# Patient Record
Sex: FEMALE | Race: WHITE | NOT HISPANIC OR LATINO | Employment: FULL TIME | URBAN - METROPOLITAN AREA
[De-identification: names, ages, dates, MRNs, and addresses within clinical notes are randomized per-mention and may not be internally consistent; named-entity substitution may affect disease eponyms.]

---

## 2024-12-08 ENCOUNTER — OFFICE VISIT (OUTPATIENT)
Dept: URGENT CARE | Age: 54
End: 2024-12-08
Payer: COMMERCIAL

## 2024-12-08 VITALS — OXYGEN SATURATION: 97 % | HEART RATE: 77 BPM | SYSTOLIC BLOOD PRESSURE: 152 MMHG | DIASTOLIC BLOOD PRESSURE: 70 MMHG

## 2024-12-08 DIAGNOSIS — U07.1 ACUTE COVID-19: ICD-10-CM

## 2024-12-08 LAB — POC SARS-COV-2 AG BINAX: ABNORMAL

## 2024-12-08 PROCEDURE — 99203 OFFICE O/P NEW LOW 30 MIN: CPT | Performed by: FAMILY MEDICINE

## 2024-12-08 PROCEDURE — 87811 SARS-COV-2 COVID19 W/OPTIC: CPT | Performed by: FAMILY MEDICINE

## 2024-12-08 RX ORDER — DICYCLOMINE HYDROCHLORIDE 10 MG/1
10 CAPSULE ORAL
COMMUNITY

## 2024-12-08 RX ORDER — QUETIAPINE FUMARATE 200 MG/1
TABLET, FILM COATED ORAL
COMMUNITY
Start: 2016-07-11

## 2024-12-08 RX ORDER — TOPIRAMATE 25 MG/1
25 TABLET ORAL NIGHTLY
COMMUNITY
Start: 2024-03-10

## 2024-12-08 RX ORDER — DIPHENOXYLATE HYDROCHLORIDE AND ATROPINE SULFATE 2.5; .025 MG/1; MG/1
TABLET ORAL
COMMUNITY
Start: 2023-08-14

## 2024-12-08 RX ORDER — LAMOTRIGINE 200 MG/1
TABLET, ORALLY DISINTEGRATING ORAL
COMMUNITY

## 2024-12-08 RX ORDER — TRAZODONE HYDROCHLORIDE 50 MG/1
50 TABLET ORAL NIGHTLY
COMMUNITY

## 2024-12-08 RX ORDER — LUMATEPERONE 42 MG/1
42 CAPSULE ORAL
COMMUNITY
Start: 2024-06-21

## 2024-12-08 RX ORDER — SUMATRIPTAN 50 MG/1
TABLET, FILM COATED ORAL
COMMUNITY

## 2024-12-08 RX ORDER — PANTOPRAZOLE SODIUM 40 MG/1
40 TABLET, DELAYED RELEASE ORAL
COMMUNITY
Start: 2024-09-06

## 2024-12-08 RX ORDER — FLUOXETINE HYDROCHLORIDE 40 MG/1
CAPSULE ORAL
COMMUNITY

## 2024-12-08 RX ORDER — FAMOTIDINE 20 MG/1
1 TABLET, FILM COATED ORAL
COMMUNITY
Start: 2024-05-08

## 2024-12-08 RX ORDER — ESTROGENS, CONJUGATED 0.62 MG/1
0.62 TABLET, FILM COATED ORAL
COMMUNITY
Start: 2024-07-15

## 2024-12-08 RX ORDER — VILAZODONE HYDROCHLORIDE 40 MG/1
TABLET ORAL
COMMUNITY
Start: 2023-09-12

## 2024-12-08 RX ORDER — IPRATROPIUM BROMIDE 21 UG/1
SPRAY, METERED NASAL
COMMUNITY
Start: 2023-01-05

## 2024-12-08 RX ORDER — DIAZEPAM 5 MG/1
TABLET ORAL
COMMUNITY
Start: 2023-09-26

## 2024-12-08 RX ORDER — VALACYCLOVIR HYDROCHLORIDE 500 MG/1
TABLET, FILM COATED ORAL
COMMUNITY
Start: 2024-11-04

## 2024-12-08 RX ORDER — PRAZOSIN HYDROCHLORIDE 1 MG/1
CAPSULE ORAL
COMMUNITY

## 2024-12-08 RX ORDER — TIZANIDINE 4 MG/1
TABLET ORAL
COMMUNITY
Start: 2018-03-06

## 2024-12-08 RX ORDER — QUETIAPINE FUMARATE 100 MG/1
100 TABLET, FILM COATED ORAL NIGHTLY
COMMUNITY
Start: 2024-05-31

## 2024-12-08 RX ORDER — LEVOFLOXACIN 500 MG/1
500 TABLET, FILM COATED ORAL
COMMUNITY
Start: 2024-11-18

## 2024-12-08 ASSESSMENT — PAIN SCALES - GENERAL: PAINLEVEL_OUTOF10: 0-NO PAIN

## 2024-12-08 NOTE — PROGRESS NOTES
Subjective   Patient ID: Chantelle Han is a 54 y.o. female. They present today with a chief complaint of Sore Throat, Cough, and Headache (X1 day tested positive for covid today).    Patient disposition: Home    History of Present Illness  HPI  1 day of flulike symptoms.  Tested positive for COVID at home.  Has been recently had COVID.  Symptoms including sore throat, headache and sinus congestion.  Fatigue.  Does not feel terribly sick.  No history of asthma or bronchitis.  History of UC.  No abdominal pain.  No history of COVID in the past.      Past Medical History  Allergies as of 12/08/2024 - Reviewed 12/08/2024   Allergen Reaction Noted    Amoxicillin Rash and Itching 10/17/2019    Telithromycin Diarrhea 01/24/2006    Doxycycline Unknown 09/08/2011    Erythromycin Unknown 12/08/2024    Morphine GI Upset 08/05/2024    Pregabalin Other 09/06/2018    Salicylates GI Upset 09/08/2011    Adhesive Rash 05/08/2024    Derma plus Rash 05/03/2018       (Not in a hospital admission)       Current Outpatient Medications   Medication Sig Dispense Refill    Caplyta 42 mg capsule Take 42 mg by mouth once daily.      diazePAM (Valium) 5 mg tablet Take by mouth.      diphenoxylate-atropine (Lomotil) 2.5-0.025 mg tablet TAKE 1 TABLET BY MOUTH THREE TIMES A DAY AS NEEDED FOR DIARRHEA      famotidine (Pepcid) 20 mg tablet Take 1 tablet by mouth early in the morning..      ipratropium (Atrovent) 21 mcg (0.03 %) nasal spray USE 2 SPRAYS IN EACH NOSTRIL TWICE DAILY AS NEEDED      levoFLOXacin (Levaquin) 500 mg tablet Take 500 mg by mouth once daily.      pantoprazole (ProtoNix) 40 mg EC tablet Take 40 mg by mouth once daily.      Premarin 0.625 mg tablet Take 0.625 mg by mouth once daily.      QUEtiapine (SEROquel) 100 mg tablet Take 100 mg by mouth once daily at bedtime.      QUEtiapine (SEROquel) 200 mg tablet       tiZANidine (Zanaflex) 4 mg tablet Take by mouth.      topiramate (Topamax) 25 mg tablet Take 25 mg by mouth once  daily at bedtime.      valACYclovir (Valtrex) 500 mg tablet       Viibryd 40 mg tablet Take by mouth.      dicyclomine (Bentyl) 10 mg capsule Take 10 mg by mouth.      FLUoxetine (PROzac) 40 mg capsule       lamoTRIgine (LaMICtal) 200 mg disintegrating tablet       molnupiravir (Lagevrio) capsule capsule Take 800 mg by mouth twice a day.      prazosin (Minipress) 1 mg capsule       SUMAtriptan (Imitrex) 50 mg tablet       traZODone (Desyrel) 50 mg tablet Take 50 mg by mouth once daily at bedtime.       No current facility-administered medications for this visit.       There is no problem list on file for this patient.      Past Surgical History:   Procedure Laterality Date    ANKLE SURGERY  07/18/2013    Ankle Surgery    APPENDECTOMY  07/18/2013    Appendectomy    CERVICAL FUSION  07/18/2013    Cervical Vertebral Fusion    KNEE SURGERY  07/18/2013    Knee Surgery Left    OTHER SURGICAL HISTORY  03/01/2018    Lower Back Surgery Lumbar Disc    TONSILLECTOMY  07/18/2013    Tonsillectomy        reports that she has never smoked. She has never used smokeless tobacco.    Review of Systems  As noted in HPI. ROS otherwise negative unless noted.       Objective    Vitals:    12/08/24 1715   BP: 152/70   BP Location: Left arm   Patient Position: Sitting   BP Cuff Size: Adult   Pulse: 77   TempSrc: Oral   SpO2: 97%     No LMP recorded.    Physical Exam  Constitutional: vital signs reviewed. Well developed, well nourished. patient alert and patient without distress.   Psych: Normal mood and affect  Head and Face: Normal and atraumatic.    Cardiovascular: Heart rate normal, normal S1 and S2, no gallops, no murmurs and no pericardial rub. Rhythm: Normal.  Pulmonary: No respiratory distress. Palpation of chest: Normal. Clear bilateral breath sounds.   Skin: Normal skin color and pigmentation, normal skin turgor, and no rash.  ENT: No cervical lymphadenopathy.  Posterior pharynx normal.  Oral mucosal moist normal.  Normal bilateral  ear canals, TMs and external ears.          Procedures    Point of Care Test & Imaging Results from this visit  Results for orders placed or performed in visit on 12/08/24   POCT BinaxNOW Covid-19 Ag Card manually resulted    Collection Time: 12/08/24  5:23 PM   Result Value Ref Range    POC DINO-COV-2 AG Positive test for SARS-CoV-2 (antigen detected) (A) Presumptive negative test for SARS-CoV-2 (no antigen detected)            Diagnostic study results (if any) were reviewed.    Assessment/Plan   Allergies, medications, history, and pertinent labs/EKGs/Imaging reviewed.    Medical Decision Making  See note    Orders and Diagnoses  Diagnoses and all orders for this visit:  Sore throat  -     POCT BinaxNOW Covid-19 Ag Card manually resulted      Medical Admin Record      Follow Up Instructions  No follow-ups on file.    At time of discharge patient was clinically well-appearing and HDS for outpatient management. The patient and/or family was educated regarding diagnosis, supportive care, OTC and Rx medications. The patient and/or family was given the opportunity to ask questions prior to discharge and all questions answered. They verbalized understanding of my discussion of the plans for treatment, expected course, indications to return to  or seek further evaluation in ED, and the need for timely follow up as directed.      Electronically signed by [facility]

## 2024-12-08 NOTE — PATIENT INSTRUCTIONS
You tested positive for COVID-19.  Symptoms may last for up to 1-2 weeks, but follow up with PCP if your symptoms start worsening.  For muscle aches, fever, chills: Acetaminophen or Ibuprofen, Advil, or Aleve can be used.  Hydration: Maintain adequate hydration with water.  Nasal symptoms: Nasal Saline available over-the-counter, can be used 3-4 times per day  Decongestants reduce nasal congestion and discharge  Vaseline at opening of nares may reduce irritation   Cool Mist Humidifier may loosens discharge  Cough Suppressants or expectorants may be taken over-the-counter    Antiviral medication and treatment for COVID-19 has been discussed as well as medications, side effects, course.  This medicine will be deferred at this time due to the severity of your symptoms      Hand hygiene is important, please wash your hands before and after coming in contact with other people.    CDC recommends isolating at home for the first 5 days of symptoms.  On a 6-day, if you are feeling better, you may return to the community, however, wear a mask at all times for the following 5 days.  Covid19 can make you more sick and lead to secondary infections, if you start having new or worsening symptoms such as persistent fevers, trouble breathing, follow-up sooner or go to the emergency room.

## 2025-07-15 ENCOUNTER — APPOINTMENT (OUTPATIENT)
Dept: CARDIOLOGY | Facility: HOSPITAL | Age: 55
End: 2025-07-15
Payer: COMMERCIAL

## 2025-07-15 ENCOUNTER — APPOINTMENT (OUTPATIENT)
Dept: RADIOLOGY | Facility: HOSPITAL | Age: 55
End: 2025-07-15
Payer: COMMERCIAL

## 2025-07-15 ENCOUNTER — HOSPITAL ENCOUNTER (EMERGENCY)
Facility: HOSPITAL | Age: 55
Discharge: HOME | End: 2025-07-15
Attending: STUDENT IN AN ORGANIZED HEALTH CARE EDUCATION/TRAINING PROGRAM
Payer: COMMERCIAL

## 2025-07-15 VITALS
DIASTOLIC BLOOD PRESSURE: 67 MMHG | WEIGHT: 130 LBS | OXYGEN SATURATION: 96 % | HEIGHT: 64 IN | HEART RATE: 96 BPM | TEMPERATURE: 97.9 F | BODY MASS INDEX: 22.2 KG/M2 | RESPIRATION RATE: 18 BRPM | SYSTOLIC BLOOD PRESSURE: 116 MMHG

## 2025-07-15 DIAGNOSIS — E83.42 HYPOMAGNESEMIA: ICD-10-CM

## 2025-07-15 DIAGNOSIS — R09.1 PLEURITIS: ICD-10-CM

## 2025-07-15 DIAGNOSIS — R07.9 ACUTE CHEST PAIN: Primary | ICD-10-CM

## 2025-07-15 LAB
ALBUMIN SERPL BCP-MCNC: 4.5 G/DL (ref 3.4–5)
ALP SERPL-CCNC: 66 U/L (ref 33–110)
ALT SERPL W P-5'-P-CCNC: 10 U/L (ref 7–45)
ANION GAP SERPL CALC-SCNC: 12 MMOL/L (ref 10–20)
APPEARANCE UR: CLEAR
AST SERPL W P-5'-P-CCNC: 17 U/L (ref 9–39)
ATRIAL RATE: 101 BPM
ATRIAL RATE: 133 BPM
BACTERIA #/AREA URNS AUTO: ABNORMAL /HPF
BASOPHILS # BLD AUTO: 0.1 X10*3/UL (ref 0–0.1)
BASOPHILS NFR BLD AUTO: 0.6 %
BILIRUB SERPL-MCNC: 0.5 MG/DL (ref 0–1.2)
BILIRUB UR STRIP.AUTO-MCNC: NEGATIVE MG/DL
BUN SERPL-MCNC: 7 MG/DL (ref 6–23)
CALCIUM SERPL-MCNC: 9.2 MG/DL (ref 8.6–10.3)
CARDIAC TROPONIN I PNL SERPL HS: <3 NG/L (ref 0–13)
CARDIAC TROPONIN I PNL SERPL HS: <3 NG/L (ref 0–13)
CHLORIDE SERPL-SCNC: 99 MMOL/L (ref 98–107)
CO2 SERPL-SCNC: 30 MMOL/L (ref 21–32)
COLOR UR: COLORLESS
CREAT SERPL-MCNC: 0.73 MG/DL (ref 0.5–1.05)
CRP SERPL-MCNC: 0.64 MG/DL
EGFRCR SERPLBLD CKD-EPI 2021: >90 ML/MIN/1.73M*2
EOSINOPHIL # BLD AUTO: 0.29 X10*3/UL (ref 0–0.7)
EOSINOPHIL NFR BLD AUTO: 1.7 %
ERYTHROCYTE [DISTWIDTH] IN BLOOD BY AUTOMATED COUNT: 12.3 % (ref 11.5–14.5)
GLUCOSE SERPL-MCNC: 103 MG/DL (ref 74–99)
GLUCOSE UR STRIP.AUTO-MCNC: NORMAL MG/DL
HCT VFR BLD AUTO: 39.5 % (ref 36–46)
HGB BLD-MCNC: 13.4 G/DL (ref 12–16)
HOLD SPECIMEN: NORMAL
IMM GRANULOCYTES # BLD AUTO: 0.04 X10*3/UL (ref 0–0.7)
IMM GRANULOCYTES NFR BLD AUTO: 0.2 % (ref 0–0.9)
INR PPP: 1 (ref 0.9–1.1)
KETONES UR STRIP.AUTO-MCNC: NEGATIVE MG/DL
LACTATE SERPL-SCNC: 1 MMOL/L (ref 0.4–2)
LEUKOCYTE ESTERASE UR QL STRIP.AUTO: NEGATIVE
LIPASE SERPL-CCNC: 40 U/L (ref 9–82)
LYMPHOCYTES # BLD AUTO: 4.59 X10*3/UL (ref 1.2–4.8)
LYMPHOCYTES NFR BLD AUTO: 27 %
MCH RBC QN AUTO: 33 PG (ref 26–34)
MCHC RBC AUTO-ENTMCNC: 33.9 G/DL (ref 32–36)
MCV RBC AUTO: 97 FL (ref 80–100)
MONOCYTES # BLD AUTO: 1.14 X10*3/UL (ref 0.1–1)
MONOCYTES NFR BLD AUTO: 6.7 %
NEUTROPHILS # BLD AUTO: 10.86 X10*3/UL (ref 1.2–7.7)
NEUTROPHILS NFR BLD AUTO: 63.8 %
NITRITE UR QL STRIP.AUTO: NEGATIVE
NRBC BLD-RTO: 0 /100 WBCS (ref 0–0)
P AXIS: 56 DEGREES
P AXIS: 61 DEGREES
P OFFSET: 195 MS
P OFFSET: 211 MS
P ONSET: 139 MS
P ONSET: 150 MS
PH UR STRIP.AUTO: 7 [PH]
PLATELET # BLD AUTO: 313 X10*3/UL (ref 150–450)
POTASSIUM SERPL-SCNC: 3 MMOL/L (ref 3.5–5.3)
PR INTERVAL: 148 MS
PR INTERVAL: 168 MS
PROT SERPL-MCNC: 7.5 G/DL (ref 6.4–8.2)
PROT UR STRIP.AUTO-MCNC: NEGATIVE MG/DL
PROTHROMBIN TIME: 11.3 SECONDS (ref 9.8–12.4)
Q ONSET: 223 MS
Q ONSET: 224 MS
QRS COUNT: 17 BEATS
QRS COUNT: 22 BEATS
QRS DURATION: 72 MS
QRS DURATION: 74 MS
QT INTERVAL: 290 MS
QT INTERVAL: 358 MS
QTC CALCULATION(BAZETT): 431 MS
QTC CALCULATION(BAZETT): 464 MS
QTC FREDERICIA: 377 MS
QTC FREDERICIA: 426 MS
R AXIS: 13 DEGREES
R AXIS: 2 DEGREES
RBC # BLD AUTO: 4.06 X10*6/UL (ref 4–5.2)
RBC # UR STRIP.AUTO: ABNORMAL MG/DL
RBC #/AREA URNS AUTO: ABNORMAL /HPF
SODIUM SERPL-SCNC: 138 MMOL/L (ref 136–145)
SP GR UR STRIP.AUTO: 1.03
SQUAMOUS #/AREA URNS AUTO: ABNORMAL /HPF
T AXIS: 162 DEGREES
T AXIS: 85 DEGREES
T OFFSET: 369 MS
T OFFSET: 402 MS
UROBILINOGEN UR STRIP.AUTO-MCNC: NORMAL MG/DL
VENTRICULAR RATE: 101 BPM
VENTRICULAR RATE: 133 BPM
WBC # BLD AUTO: 17 X10*3/UL (ref 4.4–11.3)
WBC #/AREA URNS AUTO: ABNORMAL /HPF

## 2025-07-15 PROCEDURE — 2500000004 HC RX 250 GENERAL PHARMACY W/ HCPCS (ALT 636 FOR OP/ED)

## 2025-07-15 PROCEDURE — 86140 C-REACTIVE PROTEIN: CPT | Performed by: STUDENT IN AN ORGANIZED HEALTH CARE EDUCATION/TRAINING PROGRAM

## 2025-07-15 PROCEDURE — 85025 COMPLETE CBC W/AUTO DIFF WBC: CPT | Performed by: STUDENT IN AN ORGANIZED HEALTH CARE EDUCATION/TRAINING PROGRAM

## 2025-07-15 PROCEDURE — 2500000005 HC RX 250 GENERAL PHARMACY W/O HCPCS: Performed by: STUDENT IN AN ORGANIZED HEALTH CARE EDUCATION/TRAINING PROGRAM

## 2025-07-15 PROCEDURE — 99285 EMERGENCY DEPT VISIT HI MDM: CPT | Mod: 25 | Performed by: STUDENT IN AN ORGANIZED HEALTH CARE EDUCATION/TRAINING PROGRAM

## 2025-07-15 PROCEDURE — 96365 THER/PROPH/DIAG IV INF INIT: CPT | Mod: 59

## 2025-07-15 PROCEDURE — 74177 CT ABD & PELVIS W/CONTRAST: CPT

## 2025-07-15 PROCEDURE — 84484 ASSAY OF TROPONIN QUANT: CPT | Performed by: STUDENT IN AN ORGANIZED HEALTH CARE EDUCATION/TRAINING PROGRAM

## 2025-07-15 PROCEDURE — 96376 TX/PRO/DX INJ SAME DRUG ADON: CPT | Mod: 59

## 2025-07-15 PROCEDURE — 2550000001 HC RX 255 CONTRASTS: Performed by: STUDENT IN AN ORGANIZED HEALTH CARE EDUCATION/TRAINING PROGRAM

## 2025-07-15 PROCEDURE — 71045 X-RAY EXAM CHEST 1 VIEW: CPT | Performed by: SURGERY

## 2025-07-15 PROCEDURE — 2500000001 HC RX 250 WO HCPCS SELF ADMINISTERED DRUGS (ALT 637 FOR MEDICARE OP): Performed by: STUDENT IN AN ORGANIZED HEALTH CARE EDUCATION/TRAINING PROGRAM

## 2025-07-15 PROCEDURE — 85610 PROTHROMBIN TIME: CPT | Performed by: STUDENT IN AN ORGANIZED HEALTH CARE EDUCATION/TRAINING PROGRAM

## 2025-07-15 PROCEDURE — 93005 ELECTROCARDIOGRAM TRACING: CPT

## 2025-07-15 PROCEDURE — 96366 THER/PROPH/DIAG IV INF ADDON: CPT

## 2025-07-15 PROCEDURE — 71275 CT ANGIOGRAPHY CHEST: CPT | Performed by: SURGERY

## 2025-07-15 PROCEDURE — 80053 COMPREHEN METABOLIC PANEL: CPT | Performed by: STUDENT IN AN ORGANIZED HEALTH CARE EDUCATION/TRAINING PROGRAM

## 2025-07-15 PROCEDURE — 71045 X-RAY EXAM CHEST 1 VIEW: CPT

## 2025-07-15 PROCEDURE — 83690 ASSAY OF LIPASE: CPT | Performed by: STUDENT IN AN ORGANIZED HEALTH CARE EDUCATION/TRAINING PROGRAM

## 2025-07-15 PROCEDURE — 2500000001 HC RX 250 WO HCPCS SELF ADMINISTERED DRUGS (ALT 637 FOR MEDICARE OP)

## 2025-07-15 PROCEDURE — 36415 COLL VENOUS BLD VENIPUNCTURE: CPT | Performed by: STUDENT IN AN ORGANIZED HEALTH CARE EDUCATION/TRAINING PROGRAM

## 2025-07-15 PROCEDURE — 71275 CT ANGIOGRAPHY CHEST: CPT

## 2025-07-15 PROCEDURE — 74177 CT ABD & PELVIS W/CONTRAST: CPT | Performed by: SURGERY

## 2025-07-15 PROCEDURE — 81001 URINALYSIS AUTO W/SCOPE: CPT | Performed by: STUDENT IN AN ORGANIZED HEALTH CARE EDUCATION/TRAINING PROGRAM

## 2025-07-15 PROCEDURE — 83605 ASSAY OF LACTIC ACID: CPT | Performed by: STUDENT IN AN ORGANIZED HEALTH CARE EDUCATION/TRAINING PROGRAM

## 2025-07-15 PROCEDURE — 2500000004 HC RX 250 GENERAL PHARMACY W/ HCPCS (ALT 636 FOR OP/ED): Mod: JZ | Performed by: STUDENT IN AN ORGANIZED HEALTH CARE EDUCATION/TRAINING PROGRAM

## 2025-07-15 PROCEDURE — 96375 TX/PRO/DX INJ NEW DRUG ADDON: CPT | Mod: 59

## 2025-07-15 RX ORDER — PREDNISONE 20 MG/1
40 TABLET ORAL DAILY
Qty: 10 TABLET | Refills: 0 | Status: SHIPPED | OUTPATIENT
Start: 2025-07-15 | End: 2025-07-20

## 2025-07-15 RX ORDER — LIDOCAINE 560 MG/1
1 PATCH PERCUTANEOUS; TOPICAL; TRANSDERMAL ONCE
Status: DISCONTINUED | OUTPATIENT
Start: 2025-07-15 | End: 2025-07-15 | Stop reason: HOSPADM

## 2025-07-15 RX ORDER — ONDANSETRON HYDROCHLORIDE 2 MG/ML
4 INJECTION, SOLUTION INTRAVENOUS ONCE
Status: COMPLETED | OUTPATIENT
Start: 2025-07-15 | End: 2025-07-15

## 2025-07-15 RX ORDER — PREDNISONE 50 MG/1
50 TABLET ORAL ONCE
Status: COMPLETED | OUTPATIENT
Start: 2025-07-15 | End: 2025-07-15

## 2025-07-15 RX ORDER — CALCIUM CARBONATE 300MG(750)
400 TABLET,CHEWABLE ORAL DAILY
Qty: 10 TABLET | Refills: 0 | Status: SHIPPED | OUTPATIENT
Start: 2025-07-15 | End: 2025-07-25

## 2025-07-15 RX ORDER — POTASSIUM CHLORIDE 14.9 MG/ML
20 INJECTION INTRAVENOUS ONCE
Status: COMPLETED | OUTPATIENT
Start: 2025-07-15 | End: 2025-07-15

## 2025-07-15 RX ORDER — LIDOCAINE HYDROCHLORIDE 20 MG/ML
15 SOLUTION OROPHARYNGEAL ONCE
Status: COMPLETED | OUTPATIENT
Start: 2025-07-15 | End: 2025-07-15

## 2025-07-15 RX ORDER — HYDROMORPHONE HYDROCHLORIDE 1 MG/ML
1 INJECTION, SOLUTION INTRAMUSCULAR; INTRAVENOUS; SUBCUTANEOUS ONCE
Status: COMPLETED | OUTPATIENT
Start: 2025-07-15 | End: 2025-07-15

## 2025-07-15 RX ORDER — PREDNISONE 20 MG/1
40 TABLET ORAL DAILY
Qty: 10 TABLET | Refills: 0 | Status: SHIPPED | OUTPATIENT
Start: 2025-07-15 | End: 2025-07-15

## 2025-07-15 RX ORDER — ALUMINUM HYDROXIDE, MAGNESIUM HYDROXIDE, AND SIMETHICONE 1200; 120; 1200 MG/30ML; MG/30ML; MG/30ML
30 SUSPENSION ORAL ONCE
Status: COMPLETED | OUTPATIENT
Start: 2025-07-15 | End: 2025-07-15

## 2025-07-15 RX ORDER — OXYCODONE HYDROCHLORIDE 5 MG/1
5 TABLET ORAL EVERY 6 HOURS PRN
Qty: 7 TABLET | Refills: 0 | Status: SHIPPED | OUTPATIENT
Start: 2025-07-15

## 2025-07-15 RX ORDER — ACETAMINOPHEN 325 MG/1
975 TABLET ORAL ONCE
Status: COMPLETED | OUTPATIENT
Start: 2025-07-15 | End: 2025-07-15

## 2025-07-15 RX ORDER — KETOROLAC TROMETHAMINE 15 MG/ML
15 INJECTION, SOLUTION INTRAMUSCULAR; INTRAVENOUS ONCE
Status: COMPLETED | OUTPATIENT
Start: 2025-07-15 | End: 2025-07-15

## 2025-07-15 RX ORDER — OXYCODONE HYDROCHLORIDE 5 MG/1
5 TABLET ORAL EVERY 6 HOURS PRN
Qty: 7 TABLET | Refills: 0 | Status: SHIPPED | OUTPATIENT
Start: 2025-07-15 | End: 2025-07-15

## 2025-07-15 RX ADMIN — IOHEXOL 75 ML: 350 INJECTION, SOLUTION INTRAVENOUS at 02:50

## 2025-07-15 RX ADMIN — SODIUM CHLORIDE, SODIUM LACTATE, POTASSIUM CHLORIDE, AND CALCIUM CHLORIDE 1000 ML: .6; .31; .03; .02 INJECTION, SOLUTION INTRAVENOUS at 02:31

## 2025-07-15 RX ADMIN — PREDNISONE 50 MG: 50 TABLET ORAL at 05:41

## 2025-07-15 RX ADMIN — ALUMINUM HYDROXIDE, MAGNESIUM HYDROXIDE, AND SIMETHICONE 30 ML: 200; 200; 20 SUSPENSION ORAL at 04:03

## 2025-07-15 RX ADMIN — ONDANSETRON 4 MG: 2 INJECTION INTRAMUSCULAR; INTRAVENOUS at 01:40

## 2025-07-15 RX ADMIN — HYDROMORPHONE HYDROCHLORIDE 1 MG: 1 INJECTION, SOLUTION INTRAMUSCULAR; INTRAVENOUS; SUBCUTANEOUS at 02:31

## 2025-07-15 RX ADMIN — POTASSIUM CHLORIDE 20 MEQ: 14.9 INJECTION, SOLUTION INTRAVENOUS at 02:31

## 2025-07-15 RX ADMIN — ACETAMINOPHEN 975 MG: 325 TABLET ORAL at 07:36

## 2025-07-15 RX ADMIN — HYDROMORPHONE HYDROCHLORIDE 0.5 MG: 1 INJECTION, SOLUTION INTRAMUSCULAR; INTRAVENOUS; SUBCUTANEOUS at 01:41

## 2025-07-15 RX ADMIN — LIDOCAINE HYDROCHLORIDE 15 ML: 20 SOLUTION ORAL at 04:03

## 2025-07-15 RX ADMIN — KETOROLAC TROMETHAMINE 15 MG: 15 INJECTION, SOLUTION INTRAMUSCULAR; INTRAVENOUS at 04:47

## 2025-07-15 ASSESSMENT — LIFESTYLE VARIABLES
EVER FELT BAD OR GUILTY ABOUT YOUR DRINKING: NO
HAVE YOU EVER FELT YOU SHOULD CUT DOWN ON YOUR DRINKING: NO
HAVE PEOPLE ANNOYED YOU BY CRITICIZING YOUR DRINKING: NO
TOTAL SCORE: 0
EVER HAD A DRINK FIRST THING IN THE MORNING TO STEADY YOUR NERVES TO GET RID OF A HANGOVER: NO

## 2025-07-15 ASSESSMENT — HEART SCORE
ECG: NORMAL
HEART SCORE: 2
RISK FACTORS: NO KNOWN RISK FACTORS
AGE: 45-64
TROPONIN: LESS THAN OR EQUAL TO NORMAL LIMIT
HISTORY: MODERATELY SUSPICIOUS

## 2025-07-15 ASSESSMENT — PAIN DESCRIPTION - DESCRIPTORS
DESCRIPTORS: STABBING
DESCRIPTORS: STABBING

## 2025-07-15 ASSESSMENT — PAIN DESCRIPTION - ONSET: ONSET: AWAKENED FROM SLEEP

## 2025-07-15 ASSESSMENT — PAIN DESCRIPTION - FREQUENCY: FREQUENCY: CONSTANT/CONTINUOUS

## 2025-07-15 ASSESSMENT — PAIN DESCRIPTION - PAIN TYPE: TYPE: ACUTE PAIN

## 2025-07-15 ASSESSMENT — PAIN DESCRIPTION - ORIENTATION: ORIENTATION: MID

## 2025-07-15 ASSESSMENT — PAIN DESCRIPTION - LOCATION: LOCATION: CHEST

## 2025-07-15 ASSESSMENT — PAIN SCALES - GENERAL: PAINLEVEL_OUTOF10: 8

## 2025-07-15 ASSESSMENT — PAIN - FUNCTIONAL ASSESSMENT: PAIN_FUNCTIONAL_ASSESSMENT: 0-10

## 2025-07-15 NOTE — DISCHARGE INSTRUCTIONS
You were seen in the Emergency Department for chest pain. There was no critical cause of your chest pain found on your work-up today. Your risk for damage to your heart, aorta (main vessel exiting the heart), or clot in the heart or lungs was low or tested negative for.    Please rest and utilize tylenol as needed for your pain.    Seek immediate medical attention if you develop: new or worsening chest pain, nausea, vomiting, weakness, numbness, tingling, excessive sweating, shortness of breath, difficulty breathing, fainting, fevers 38 C (100.4 F) or higher, loss of motion in your arms or legs, or any new or worsening symptoms.

## 2025-07-15 NOTE — ED PROVIDER NOTES
Emergency Department Provider Note       History of Present Illness     History provided by: Patient and Significant Other  Limitations to History: None  External Records Reviewed with Brief Summary: None    HPI:  Chantelle Han is a 55 y.o. female with a PMH of anxiety/depression, bipolar, and hemochromatosis presenting to the ED with complaint of stabbing chest pain.  Patient reports stabbing chest pain rating to her back beginning 1 hour ago (10 PM) in which she reports radiation to her left shoulder/scapular area.  Rates the pain at a 9/10.  Endorses undergoing cholecystectomy 1 week ago ().   endorses she took Gas-X without relief.  Denies fevers, vomiting, diarrhea, cough, trauma, dysuria, leg swelling, redness in urine or stool.      OB/GYN: G0, partial hysterectomy    Physical Exam   Triage vitals:  T 36.6 °C (97.9 °F)  HR (!) 138  BP (!) 174/111  RR (!) 22  O2 98 % None (Room air)    General: Awake, alert, appears uncomfortable  Eyes: Gaze conjugate.  No scleral icterus or injection  HENT: Normo-cephalic, atraumatic. No stridor  CV: Tachycardic rate, regular rhythm. Radial pulses 2+ bilaterally  Resp: Breathing non-labored, speaking in full sentences.  Clear to auscultation bilaterally  GI: Soft, non-distended, non-tender. No rebound or guarding.  MSK/Extremities: No gross bony deformities. Moving all extremities.  Tenderness to palpation over the left trapezius and right chest wall.  No lower extremity swelling.  Skin: Warm. Appropriate color  Neuro: Alert. Oriented. Face symmetric. Speech is fluent.  Gross strength and sensation intact in b/l UE and LEs  Psych: Appropriate affect.  Appears slightly anxious.      Medical Decision Making & ED Course   Medical Decision Makin y.o. female with a history significant for hemochromatosis and recent cholecystectomy evaluated in the ED for acute severe chest pain radiating to her back and right shoulder.  Patient is afebrile, sinus,  hypertensive, saturating well on room air, and appears uncomfortable and anxious.  On exam patient has tenderness to her left trapezius and right chest wall.  Clear breath sounds.  No lower extremity swelling.  Cardiac workup ordered including lipase, CRP, coags, UA, and CT angio chest for PE with CT abdomen pelvis with IV contrast.  Considered CT angio chest abdomen pelvis to better assess aorta however chose not to as patient is not a smoker and no history of HTN or elastic tissue disease and patient's recent surgery puts her at higher risk for PE.  0.5 mg Dilaudid and 4 mg Zofran provided.    Patient continued to endorse pain in which 1 mg Dilaudid provided.  GI cocktail attempted without much relief.  Toradol and 1L LR provided with some improvement in which patient was updated on negative CT imaging and lab results.  Troponins, lipase, lactate, and hemoglobin within normal limits.  Informed of concern for pleuritis or pericarditis in which prednisone and Tylenol provided.  Due to degree of analgesia required for her symptoms with leukocytosis of 17, tachycardia, tachypnea, and hypertension, she is diagnosed with pleuritis and prescribed prednisone burst and oxycodone.  Instructed to take Tylenol and ibuprofen in conjunction as well as follow-up with her PCP.  ----  HEART score: 2    Differential diagnoses considered include but are not limited to: Acute coronary syndrome (STEMI, NSTEMI, Angina), Pulmonary embolism, Pneumothorax, Pericarditis, Myocarditis, Pneumonia, GERD, Costochondritis, MSK strain, Electrolyte abnormality (K, Ca), Anxiety/panic attack, Pleuritis, Pleural effusion    Social Determinants of Health which Significantly Impact Care: Social Determinants of Health which Significantly Impact Care: None identified     EKG Independent Interpretation: Per ED course    Independent Result Review and Interpretation: On my interpretation, CT angio chest for PE negative for PE, effusion, pneumothorax, or  dissection within sensitivity of study.  CT abdomen pelvis negative for free air, bowel obstruction, hydronephrosis, bowel thickening, or free fluid.    Chronic conditions affecting the patient's care: As documented above in [facility]    The patient was discussed with the following consultants/services: None    Care Considerations: As documented above in [facility]    ED Course:  ED Course as of 07/17/25 1344   Tue Jul 15, 2025   0348 C-Reactive Protein: 0.64 [AS]   0804 Repeat EKG performed at 0 438 and read by me showed sinus tachycardia, 100 bpm, normal DE interval, normal QRS, QTc is 464, with some nonspecific lateral ST wave flattening, but no elevations or other injury pattern [AS]   0805 Initial EKG read by my partner Ricardo Rascon performed at 12:34 read by me shows sinus rhythm, tachycardia, 133 bpm, normal DE interval, narrow QRS, QTc is 431, with upright axis, and some nonspecific ST wave flattening, which may be rate related [AS]      ED Course User Index  [AS] Jessica David DO         Diagnoses as of 07/17/25 1344   Acute chest pain   Pleuritis   Hypomagnesemia       Disposition   As a result of the work-up, the patient was discharged home.  she was informed of her diagnosis and instructed to come back with any concerns or worsening of condition.  she and was agreeable to the plan as discussed above.  she was given the opportunity to ask questions.  All of the patient's questions were answered.    Procedures   Procedures    This was a shared visit with an ED attending.  The patient was seen and discussed with the ED attending    Brian Salazar MD  Emergency Medicine                                                          Brian Salazar MD  Resident  07/17/25 7527

## 2025-07-25 ENCOUNTER — OFFICE VISIT (OUTPATIENT)
Dept: VASCULAR SURGERY | Facility: CLINIC | Age: 55
End: 2025-07-25
Payer: COMMERCIAL

## 2025-07-25 VITALS
BODY MASS INDEX: 23.39 KG/M2 | HEIGHT: 64 IN | WEIGHT: 137 LBS | DIASTOLIC BLOOD PRESSURE: 80 MMHG | HEART RATE: 67 BPM | OXYGEN SATURATION: 98 % | SYSTOLIC BLOOD PRESSURE: 120 MMHG

## 2025-07-25 DIAGNOSIS — R07.89 ATYPICAL CHEST PAIN: Primary | ICD-10-CM

## 2025-07-25 PROCEDURE — 99214 OFFICE O/P EST MOD 30 MIN: CPT | Performed by: NURSE PRACTITIONER

## 2025-07-25 PROCEDURE — 99204 OFFICE O/P NEW MOD 45 MIN: CPT | Performed by: NURSE PRACTITIONER

## 2025-07-25 PROCEDURE — 3008F BODY MASS INDEX DOCD: CPT | Performed by: NURSE PRACTITIONER

## 2025-07-25 RX ORDER — LAMOTRIGINE 100 MG/1
2 TABLET ORAL NIGHTLY
COMMUNITY

## 2025-07-25 RX ORDER — MESALAMINE 1.2 G/1
4.8 TABLET, DELAYED RELEASE ORAL DAILY
COMMUNITY

## 2025-07-25 RX ORDER — DARIDOREXANT 50 MG/1
1 TABLET, FILM COATED ORAL NIGHTLY
COMMUNITY

## 2025-07-25 RX ORDER — POTASSIUM CHLORIDE 750 MG/1
10 TABLET, EXTENDED RELEASE ORAL
COMMUNITY
Start: 2025-07-18

## 2025-07-25 NOTE — PROGRESS NOTES
"Alisha Han is a 55 y.o. female.    Chief Complaint:  56yo patient referred by PCP to cardiology for chest pain and shortness of breath.     HPI  [place] [facility] [place] for chest pain, dyspnea, and nausea/vomiting. CXR w/no acute process. CTA for PE negative. CT abd/pelvis w/no acute process. Discharged home with oral steroids.  Presented to [facility] ED on 25 with sharp stabbing chest pin radiating across bilateral shoulders and to jaw x1 week. Aggravated by inspiration. Ddimer 550. BNP WNL. EKG w/no acute changes. COVID/Flu/Stresp negative. CT for PE negative. CXR w/no acute process. Troponin WNL. Discharged home.     PMHx: UC, BPD, JB, Hemochromatosis  PSHx: Cholecystectomy, Appendectomy, Back Surgeries/Spinal Stimulator  Social Hx: Former smoker, quit . Denies EtOH/illicit drug use.   Family Hx: Father- CAD s/p MI in early 50s. Paternal Grandfather-  2/2 MI. Paternal Uncle- Jerry's s/p liver/kidney transplant. Maternal family- hemochromatosis.    Presents with spouse. Endorses midsternal chest pain aggravated by inspiration. Stabbing through to back and radiating to jaw. Endorses daily episodes <2-3 minutes of palpitations, no aggravating/alleviating factors or associated symptoms. Denies SOB on exertion. Endorses orthopnea/PND due to chest pain. Pain alleviated with leaning forward. Denies lightheadedness, dizziness, and syncope. Denies edema. Ferritin has been high, will have phlebotomy tomorrow per Hematology.     Review of Systems   Cardiovascular:  Positive for chest pain, orthopnea, palpitations and paroxysmal nocturnal dyspnea. Negative for dyspnea on exertion, irregular heartbeat, leg swelling and syncope.   Musculoskeletal:  Positive for back pain.   Neurological:  Negative for dizziness and light-headedness.     Objective   Visit Vitals  /80 (BP Location: Right arm, Patient Position: Sitting)   Pulse 67   Ht 1.626 m (5' 4\")   Wt 62.1 kg (137 lb)   SpO2 " "98%   BMI 23.52 kg/m²   Smoking Status Never   BSA 1.67 m²     Vitals reviewed.   Constitutional:       General: Awake.      Appearance: Healthy appearance. Well-developed and normal weight.   Neck:      Vascular: No hepatojugular reflux or JVD. JVD normal.   Pulmonary:      Effort: Pulmonary effort is normal.      Breath sounds: Normal breath sounds.   Cardiovascular:      Normal rate. Regular rhythm.      Murmurs: There is no murmur.      No gallop.  No click. No rub.   Edema:     Peripheral edema absent.   Skin:     General: Skin is warm and dry.   Neurological:      Mental Status: Alert and oriented to person, place and time.   Psychiatric:         Behavior: Behavior is cooperative.     Lab Review:   Lab Results   Component Value Date     07/15/2025    K 3.0 (L) 07/15/2025    CL 99 07/15/2025    CO2 30 07/15/2025    BUN 7 07/15/2025    CREATININE 0.73 07/15/2025    GLUCOSE 103 (H) 07/15/2025    CALCIUM 9.2 07/15/2025     Lab Results   Component Value Date    WBC 17.0 (H) 07/15/2025    HGB 13.4 07/15/2025    HCT 39.5 07/15/2025    MCV 97 07/15/2025     07/15/2025     No results found for: \"CHOL\", \"TRIG\", \"HDL\", \"LDLDIRECT\"    Diagnostic Imaging:  EKG 7/25/25 NSR w/HR 63bpm.   STUDY:  XR CHEST 1 VIEW;  7/15/2025 1:57 am      INDICATION:  Signs/Symptoms:Chest Pain.          COMPARISON:  07/30/2012.      ACCESSION NUMBER(S):  [id number]      ORDERING CLINICIAN:  DALTON GARCIA      FINDINGS:  AP radiograph of the chest was provided.      Leads overlie the chest, partially obscuring the field-of-view.      Interval placement of the right chest port with catheter terminus in  the region of the mid-SVC. Spinal stimulator leads.      CARDIOMEDIASTINAL SILHOUETTE:  Cardiomediastinal silhouette is normal in size and configuration.      LUNGS:  Lungs are clear. No pleural effusion or pneumothorax.      ABDOMEN:  No remarkable upper abdominal findings.      BONES:  No acute osseous changes.      IMPRESSION:  1. "  No evidence of acute cardiopulmonary process.  STUDY:  CT ANGIO CHEST FOR PULMONARY EMBOLISM;  7/15/2025 3:08 am      INDICATION:  Signs/Symptoms:Chest pain to back with tachycardia and tachypnea.          COMPARISON:  Correlation made to chest radiography of 07/15/2025.      ACCESSION NUMBER(S):  [id number]      ORDERING CLINICIAN:  DALTON GARCIA      TECHNIQUE:  Helical data acquisition of the chest was obtained after intravenous  administration of 75 ML Omnipaque 350, as per PE protocol. Images  were reformatted in coronal and sagittal planes. Axial and coronal  maximum intensity projection (MIP) images were created and reviewed.      FINDINGS:  Chest port has catheter terminus in the mid-SVC.      POTENTIAL LIMITATIONS OF THE STUDY: None      HEART AND VESSELS:  There are no discrete filling defects within main pulmonary artery  and its branches to suggest acute pulmonary embolism. Main pulmonary  artery and its branches are normal in caliber.      The thoracic aorta normal in course and caliber.  No coronary artery calcifications are seen. Please note, the study is  not optimized for evaluation of coronary arteries.      The cardiac chambers are not enlarged.      There is no pericardial effusion seen.      MEDIASTINUM AND HARSHAD, LOWER NECK AND AXILLA:  The visualized thyroid gland is within normal limits.  No pathologically enlarged lymph nodes.  Esophagus appears within normal limits as seen.      LUNGS AND AIRWAYS:  The trachea and central airways are patent. No endobronchial lesion  is seen.      Excepting minor dependent lower lung atelectasis, bilateral lungs are  clear without evidence of focal consolidation, pleural effusion, or  pneumothorax.          UPPER ABDOMEN:  The visualized subdiaphragmatic structures demonstrate no acute  findings.              CHEST WALL AND OSSEOUS STRUCTURES:  Chest wall is within normal limits.  No acute osseous pathology.There are no suspicious osseous lesions.  Mild  thoracic spondylosis.      IMPRESSION:  No evidence of acute pulmonary embolism. No evidence of acute  pathology in the chest.    EXAMINATION:  CTA CHEST (NON GATED) W IVCON PE   Exam Date/Time:  7/22/2025 8:19 PM     CLINICAL HISTORY:   Pulmonary embolism (PE) suspected, high prob       TECHNIQUE:   Spiral CT acquisition of the chest from the thoracic inlet to the upper   abdomen following IV contrast using standard protocol with coronal and   sagittal reformatted images. Maximum intensity projection images   generated.     Contrast: 100 mL Omnipaque 350 IV   CT Radiation dose: Integrated Dose-length product (DLP) for this visit =   mGy*cm   CT Dose Reduction Employed: Automated exposure control(AEC) and iterative   recon       COMPARISON:   No available prior.       RESULT:   Limitations:   None.       Evaluation for thromboembolic disease:   No filling defects to suggest acute pulmonary embolism.  The central   pulmonary arteries are within normal size limits.       Heart, pericardium, and thoracic vessels:   The heart is within normal size limits.  No pericardial effusion.  No   thoracic aortic aneurysm.  Right-sided port with tubing extending toward   the atriocaval junction.       Lower neck, lymph nodes, and mediastinum:   No pathologically enlarged lymph nodes in the chest.       Lung parenchyma, pleura, and airways:   Patent central airways. Minimal dependent bilateral lower lobe   atelectasis.  Very minimal right pleural effusion.  No left pleural   effusion. No consolidations or edema. No suspicious pulmonary nodules.    No pneumothorax.       Upper abdomen:   No acute abnormality.     Current Medications[1]    Assessment/Plan   Atypical Chest Pain  Endorses midsternal chest pain aggravated by inspiration. Stabbing through to back and radiating to jaw. Endorses daily episodes <2-3 minutes of palpitations, no aggravating/alleviating factors or associated symptoms. Denies SOB on exertion. Endorses  orthopnea/PND due to chest pain. Pain alleviated with leaning forward. Denies lightheadedness, dizziness, and syncope. Denies edema.   RRR on exam. No JVD/HJR. No visible rash or tenderness to palpation. Lungs CTAB. No peripheral edema.   EKG in office NSR w/HR 63bpm w/diffuse low voltage QRS.   Trop negative. D-dimer 530. CT for PE negative. . CXR no acute process.   Order for TTE. Low voltage QRS & increased pain w/lying, evaluate for pericardial effusion. Differential includes hx of familial/hereditary hemochromatosis. No cMRI due to spinal stimulator.        [1]   Current Outpatient Medications:     diazePAM (Valium) 10 mg tablet, Take 1 tablet (10 mg) by mouth., Disp: , Rfl:     dicyclomine (Bentyl) 10 mg capsule, Take 1 capsule (10 mg) by mouth., Disp: , Rfl:     diphenoxylate-atropine (Lomotil) 2.5-0.025 mg tablet, TAKE 1 TABLET BY MOUTH THREE TIMES A DAY AS NEEDED FOR DIARRHEA, Disp: , Rfl:     ipratropium (Atrovent) 21 mcg (0.03 %) nasal spray, USE 2 SPRAYS IN EACH NOSTRIL TWICE DAILY AS NEEDED, Disp: , Rfl:     lamoTRIgine (LaMICtal) 200 mg disintegrating tablet, , Disp: , Rfl:     oxyCODONE (Roxicodone) 5 mg immediate release tablet, Take 1 tablet (5 mg) by mouth every 6 hours if needed for severe pain (7 - 10)., Disp: 7 tablet, Rfl: 0    predniSONE (Deltasone) 20 mg tablet, Take 2 tablets (40 mg) by mouth once daily for 5 days., Disp: 10 tablet, Rfl: 0    Premarin 0.625 mg tablet, Take 1 tablet (0.625 mg) by mouth once daily., Disp: , Rfl:     SUMAtriptan (Imitrex) 50 mg tablet, PRN, Disp: , Rfl:     valACYclovir (Valtrex) 500 mg tablet, PRN, Disp: , Rfl:     Caplyta 42 mg capsule, Take 42 mg by mouth once daily., Disp: , Rfl:     famotidine (Pepcid) 20 mg tablet, Take 1 tablet by mouth early in the morning.., Disp: , Rfl:     FLUoxetine (PROzac) 40 mg capsule, , Disp: , Rfl:     levoFLOXacin (Levaquin) 500 mg tablet, Take 500 mg by mouth once daily., Disp: , Rfl:     magnesium oxide (Mag-Ox) 400  mg tablet, Take 1 tablet (400 mg) by mouth once daily for 10 days., Disp: 10 tablet, Rfl: 0    pantoprazole (ProtoNix) 40 mg EC tablet, Take 40 mg by mouth once daily., Disp: , Rfl:     prazosin (Minipress) 1 mg capsule, , Disp: , Rfl:     QUEtiapine (SEROquel) 100 mg tablet, Take 100 mg by mouth once daily at bedtime., Disp: , Rfl:     QUEtiapine (SEROquel) 200 mg tablet, , Disp: , Rfl:     tiZANidine (Zanaflex) 4 mg tablet, Take by mouth., Disp: , Rfl:     topiramate (Topamax) 25 mg tablet, Take 25 mg by mouth once daily at bedtime., Disp: , Rfl:     traZODone (Desyrel) 50 mg tablet, Take 50 mg by mouth once daily at bedtime., Disp: , Rfl:     Viibryd 40 mg tablet, Take by mouth., Disp: , Rfl:

## 2025-08-01 ASSESSMENT — ENCOUNTER SYMPTOMS
PND: 1
LIGHT-HEADEDNESS: 0
PALPITATIONS: 1
BACK PAIN: 1
DYSPNEA ON EXERTION: 0
ORTHOPNEA: 1
IRREGULAR HEARTBEAT: 0
SYNCOPE: 0
DIZZINESS: 0

## 2025-08-06 ENCOUNTER — HOSPITAL ENCOUNTER (OUTPATIENT)
Dept: CARDIOLOGY | Facility: HOSPITAL | Age: 55
Discharge: HOME | End: 2025-08-06
Payer: COMMERCIAL

## 2025-08-06 DIAGNOSIS — R07.89 ATYPICAL CHEST PAIN: ICD-10-CM

## 2025-08-06 LAB
AORTIC VALVE MEAN GRADIENT: 4 MMHG
AORTIC VALVE PEAK VELOCITY: 1.22 M/S
AV PEAK GRADIENT: 6 MMHG
AVA (PEAK VEL): 2.44 CM2
AVA (VTI): 2.51 CM2
EJECTION FRACTION APICAL 4 CHAMBER: 66.5
EJECTION FRACTION: 65 %
LEFT ATRIUM VOLUME AREA LENGTH INDEX BSA: 25.5 ML/M2
LEFT VENTRICLE INTERNAL DIMENSION DIASTOLE: 4 CM (ref 3.5–6)
LEFT VENTRICULAR OUTFLOW TRACT DIAMETER: 2 CM
LV EJECTION FRACTION BIPLANE: 65 %
MITRAL VALVE E/A RATIO: 1.11
RIGHT VENTRICLE FREE WALL PEAK S': 10.3 CM/S
RIGHT VENTRICLE PEAK SYSTOLIC PRESSURE: 22 MMHG
TRICUSPID ANNULAR PLANE SYSTOLIC EXCURSION: 2.4 CM

## 2025-08-06 PROCEDURE — 93306 TTE W/DOPPLER COMPLETE: CPT

## 2025-08-12 ENCOUNTER — OFFICE VISIT (OUTPATIENT)
Dept: VASCULAR SURGERY | Facility: CLINIC | Age: 55
End: 2025-08-12
Payer: COMMERCIAL

## 2025-08-12 VITALS
HEART RATE: 71 BPM | WEIGHT: 137 LBS | BODY MASS INDEX: 23.39 KG/M2 | SYSTOLIC BLOOD PRESSURE: 100 MMHG | DIASTOLIC BLOOD PRESSURE: 70 MMHG | HEIGHT: 64 IN | OXYGEN SATURATION: 98 %

## 2025-08-12 DIAGNOSIS — R07.89 ATYPICAL CHEST PAIN: Primary | ICD-10-CM

## 2025-08-12 PROCEDURE — 3008F BODY MASS INDEX DOCD: CPT | Performed by: NURSE PRACTITIONER

## 2025-08-12 PROCEDURE — 99214 OFFICE O/P EST MOD 30 MIN: CPT | Performed by: NURSE PRACTITIONER

## 2025-08-12 RX ORDER — LAMOTRIGINE 100 MG/1
100 TABLET ORAL DAILY
COMMUNITY

## 2025-08-12 RX ORDER — OMEPRAZOLE 40 MG/1
1 CAPSULE, DELAYED RELEASE ORAL
COMMUNITY
Start: 2025-07-17

## 2025-08-19 ASSESSMENT — ENCOUNTER SYMPTOMS
PND: 0
LIGHT-HEADEDNESS: 0
ORTHOPNEA: 0
DYSPNEA ON EXERTION: 0
SYNCOPE: 0
NEAR-SYNCOPE: 0
PALPITATIONS: 1
IRREGULAR HEARTBEAT: 0
DIZZINESS: 0